# Patient Record
Sex: FEMALE | Race: WHITE | NOT HISPANIC OR LATINO | URBAN - METROPOLITAN AREA
[De-identification: names, ages, dates, MRNs, and addresses within clinical notes are randomized per-mention and may not be internally consistent; named-entity substitution may affect disease eponyms.]

---

## 2017-04-26 ENCOUNTER — TELEPHONE (OUTPATIENT)
Dept: TRANSPLANT | Facility: CLINIC | Age: 32
End: 2017-04-26

## 2017-04-26 NOTE — TELEPHONE ENCOUNTER
"Living Kidney Donor Evaluation Completed: 2017 15:10:51 CT Updated: 2017 15:11:11 CT  Donor Name: Jeannie Jewell MRN: Note: : 1985 Age: 32Gender: Female Donor Height: 5  6\" Weight (lb): 139 BMI: 22.4  Donor Race:  Ethnicity: Not / Donor Preferred Language: English  Required?: No Current Marital Status:   Demographics: Home Address: [address] [place] [address] City: [place] State: [place] Zip: [address] Country: [place]  Best Phone: [phone] Alt Phone: Donor Email: [email or web address] Best Phone Type: Mobile Alt Phone Type:   Preferred Contact Time(s): 11:00 AM-1:00 PM Preferred Contact Day(s): Mon, Tue, Wed, Thur, Fri  Donor Screen: PASSED Donor Referred by: Tx Candidate Donor self reported ABO: A  Recipient Information: Recipient Name (Last, First): Courtney White Recipient :    [id number]  ... Donor Relationship: Aunt or Uncle Recipient Diagnosis: Recipient ABO:   MEDICAL HISTORY:  None Reported  MEDICATIONS:  \"F[place]stone vitamin \"  SURGICAL HISTORY:  Breast Augmentation  ALLERGIES:  NKDA  SOCIAL HISTORY:  EtOH: Rare (1-2 drinks/month)  Illicit Drug Use: Denies  Tobacco: Denies  SELF-REPORTED FUNCTIONAL STATUS:  \"I am able to participate in moderate recreational activities like golf, double tennis, dancing, throwing a baseball or football\"  Exercise (2 X per week)  REVIEW OF ORGAN SYSTEMS: Airway or Lungs: No Blood Disorder: No Cancer: No Diabetes,Thyroid,Adrenal,Endocrine Disorder: No Digestive or Liver: No Female Health: No Heart or Circulatory System: No Immune Diseases: No Kidneys and Bladder: No Muscles,Bones,Joints: No Neuro: No Psych: No  FAMILY HISTORY: Confirmed:  Cancer (Grandparent)  Diabetes (Grandparent)  Denied:  Heart Disease (denies)  Hypertension (denies)  Kidney Disease (denies)  Kidney Stones (denies)  DONOR INFORMATION:  Level of Education: Associate's or technical degree complete Employment Status: Full Time Employer: [employer] " Medical Insurance Status: Has medical insurance Current Accommodation: Owns own home/apartment Living Arrangement: With spouse Allow Disclosure to Recipient: Yes Paired Kidney Exchange Education Level: None Paired Kidney Exchange Participation Consent: Unsure Donor Motivation: Ready to start evaluation with reservations  HIGH RISK BEHAVIOR:  Blood transfusion < 12 months. (NO)  Commercial sex < 12 months. (NO)  Illicit IV drug use < 5yrs. (NO)  Other high risk sexual contact < 12 months. (NO)  EMERGENCY CONTACT INFORMATION:  Primary Secondary First Name: Lidya Last Name: Laura Phone Number: [phone] Relationship: Mother  First Name: Dutch  Last Name: Best Phone Number: [phone] Relationship: Spouse  REASON FOR DONATION:   Help my aunt  PHYSICIAN CONTACT INFORMATION:

## 2017-04-26 NOTE — TELEPHONE ENCOUNTER
Left message asking Paul to return call for screening. Need to find out what side of family she's related to recip who has PKD.No pkt sent yet.